# Patient Record
Sex: FEMALE | Race: WHITE
[De-identification: names, ages, dates, MRNs, and addresses within clinical notes are randomized per-mention and may not be internally consistent; named-entity substitution may affect disease eponyms.]

---

## 2019-03-31 ENCOUNTER — HOSPITAL ENCOUNTER (INPATIENT)
Dept: HOSPITAL 53 - M ED | Age: 34
LOS: 2 days | Discharge: HOME | DRG: 753 | End: 2019-04-02
Attending: PSYCHIATRY & NEUROLOGY | Admitting: PSYCHIATRY & NEUROLOGY
Payer: MEDICAID

## 2019-03-31 VITALS — WEIGHT: 158.73 LBS | HEIGHT: 63 IN | BODY MASS INDEX: 28.12 KG/M2

## 2019-03-31 DIAGNOSIS — F41.0: ICD-10-CM

## 2019-03-31 DIAGNOSIS — F17.210: ICD-10-CM

## 2019-03-31 DIAGNOSIS — F41.1: ICD-10-CM

## 2019-03-31 DIAGNOSIS — F31.89: Primary | ICD-10-CM

## 2019-03-31 DIAGNOSIS — F10.10: ICD-10-CM

## 2019-04-01 VITALS — SYSTOLIC BLOOD PRESSURE: 135 MMHG | DIASTOLIC BLOOD PRESSURE: 88 MMHG

## 2019-04-01 RX ADMIN — MULTIPLE VITAMINS W/ MINERALS TAB SCH TAB: TAB at 18:05

## 2019-04-01 RX ADMIN — Medication SCH MG: at 18:05

## 2019-04-01 RX ADMIN — FOLIC ACID SCH MG: 1 TABLET ORAL at 18:05

## 2019-04-01 RX ADMIN — NICOTINE SCH PATCH: 21 PATCH, EXTENDED RELEASE TRANSDERMAL at 18:05

## 2019-04-02 VITALS — SYSTOLIC BLOOD PRESSURE: 148 MMHG | DIASTOLIC BLOOD PRESSURE: 87 MMHG

## 2019-04-02 RX ADMIN — Medication SCH MG: at 09:06

## 2019-04-02 RX ADMIN — FOLIC ACID SCH MG: 1 TABLET ORAL at 09:06

## 2019-04-02 RX ADMIN — MULTIPLE VITAMINS W/ MINERALS TAB SCH TAB: TAB at 09:06

## 2019-04-02 RX ADMIN — NICOTINE SCH PATCH: 21 PATCH, EXTENDED RELEASE TRANSDERMAL at 09:06

## 2019-04-02 NOTE — MHHPEPDOC
General


Date Of Admission:  May 31, 2019


Legal Status:  9.39


Chief Complaint


"Pt was transferred from [facility] after being medically cleared.  

She took an OD of Motrin & sleeping pills while intoxicated".





History of Present Illness


HISTORY OF THE PRESENT ILLNESS: Patient is a 33 -year-old , female, who

as per ED report: "Pt states that she was drinking & wanted to take a couple Mo

bryanna so that she


would not wake up with a headache but she ended up taking 20 of them plus 4 OTC 

sleeping pills.  Pt denies SI & states the only reason she took the OD was 

because she was intoxicated & not thinking clearly.  Main triggers are that she 

recently quit her job & she was engaged to a boyfriend of 17 years that she has 

two children with but then she met someone else.  She is currently with the new 

boyfriend but feels that she has to "please everyone" & should go back to her 

fiance even though she is no longer in love with him.  Pt c/o anxiety & erratic 

sleep.  She denies all other mental health sx's, including depression.  Pt 

denies any prior suicide attempts.  She has no OP tx.  Her YASHIRA on admission to 

[facility] was 0.166 & her tox screen was negative for any drugs".





Psychiatric Review of Systems


Depression (2 or more weeks):  denies


Gala (4 or more days of):  denies


Psychosis:  denies


PTSD:  denies


Anxiety:  situational anxiety, panic attacks





Past Psychiatric History


Previous Psychiatric Diagnosis: Panic attacks, she says she had major ones x 3 

but she has had "minor ones" once or twice/year, so, she takes deep breaths or 

goes for a walk...


Previous Psychiatric Admissions: Denies


Suicide Attempts: Denies


Psychiatric Follow-up: Denies


Psychiatric medications: Xanax was prescribed in the past, a few years back but 

she only took one because she felt "like a zombie" and she didn't want to bcome 

addicted to it.





Past Medical History


Head Injury:  No


Seizures:  No


Hospitalizations:  Yes (She had a problem with her heart, but she doesn't 

remember what caused the problem, she says "it felt like an elephant sitting on 

her chest", she says she was told it was inflammation of her heart. She recived 

medications for it and it went away . 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 

delivery))


Surgeries:  No





Family Medical/Psychiatric HX


Medical Problems


Mother has high blood pressure, high cholesterol. Father has COPD, sister has 

lupus


Psychiatric Disorders:  No


Addiction:  No


Suicide Attemps/Completions:  No





Addiction History


nicotine (A pack/day for about 15 years), alcohol (occasionally (once /weekend),

10-12 beers and occasionally she adds 5 shots if she is at the bar)





Social History


Childhood: "Great". She has 3 sisters, got along with them. She was close to her

parents "I'm not bragging about being concepcion's little girl". She enjoyed going to

school.


Abuse/Trauma: Denies


Current Living Situation: She lives with her boyfriend, her children are at 

their grandparents (paternal)


Education: HS diploma, she wanted to go to College but couldn't because of the 

money factor


Employment: Unemployed


Social Support: Boyfriend, mother


Legal: Denies


Marital: , has 2 children (11 and 7)





Mental Status Examination


General Appearance:  well groomed, appears stated age, hospital scubs/clothing


Build:  average


Demeanor:  average


Eye Contact:  average


Activity:  average


Behavior:  cooperative


Speech:  spontaneous, reg/rate,rhythm,volume


Mood:  anxious


Affect:  full, appropriate, congruent, anxious


Thought Process:  logical/linear


Thought Content (Delusions):  none reported


Thought Content (Other):  none reported


Thought Content (Aggressive):  none reported


Perception (Hallucinations):  none reported


Perception (Other):  none reported


Cognition (Impairment of):  none reported


Cognition(Intelligence Est.):  average


Oriented:  Awake, Alert, Oriented times three


Insight:  fair


Judgment:  Fair


Psychosis:  Denies





Diagnoses


1. Other specified mood disorder


2. Generalized anxiety disorder


3. h/ Panic attacks





Assessment


Patient is pleasant and cooperative, she's not depressed, she is anxious and has

a nicotine use d/o plus alcohol use disorder but she is not insightful, she says

she can quit her alcohol use/abuse and nicotine use/abuse. Discussed this 

problem with her but she became a little bit





Problem List


Problems:  


(1) Alcohol abuse


Status:  Chronic





Initial Treatment Plan


1. Patient was admitted on a [9.39] status.


2. Complete history was obtained.


3. With patients permission, family will be contacted and database will be 

expanded. 


4. Patients medication regimen will be reviewed and changed accordingly. 


5. Patient will be provided with protected environment. 


6. Patient will be treated with individual, group, and milieu therapies. 


7. Patient will receive supportive psych-education.


8. Discharge planning will commence immediately.


9. Outpatient follow-up treatment will be strongly recommended.


10. The initial treatment plan will focus initially on:


* Depression.


* Risk for suicide.


* Substance abuse.





ESTIMATED LENGTH OF STAY: 5-7 DAYS.





TIME SPENT COUNSELING AND COORDINATING INITIAL CARE: 50 minutes.





Vital Signs





Vital Signs








  Date Time  Temp Pulse Resp B/P (MAP) Pulse Ox O2 Delivery O2 Flow Rate FiO2


 


4/2/19 07:33  75  148/87    


 


4/2/19 06:31 99.2  18     


 


4/1/19 20:08     100   


 


4/1/19 11:46      Room Air  











Medications


No Active Prescriptions or Reported Meds





Allergies


Coded Allergies:  


     No Known Allergies (Unverified , 3/31/19)











BOBBY CELAYA MD              Apr 2, 2019 11:05

## 2019-08-02 NOTE — MHDSPDOC
[facility] Discharge Summary


Discharge Summary


DATE OF ADMISSION: Apr 1, 2019 at 14:44 


DATE OF DISCHARGE: Apr 2, 2019 at 14:35





DISCHARGE DIAGNOSES:


1. Other specified mood disorder


2. Generalized anxiety disorder


3. h/ Panic attacks





REASON FOR ADMISSION: Chief Complaint


"Pt was transferred from [facility] after being medically cleared.  

She took an OD of Motrin & sleeping pills while intoxicated".





History of Present Illness


HISTORY OF THE PRESENT ILLNESS: Patient is a 33 -year-old , female, who

as per ED report: "Pt states that she was drinking & wanted to take a couple 

Motrin so that she


would not wake up with a headache but she ended up taking 20 of them plus 4 OTC 

sleeping pills.  Pt denies SI & states the only reason she took the OD was 

because she was intoxicated & not thinking clearly.  Main triggers are that she 

recently quit her job & she was engaged to a boyfriend of 17 years that she has 

two children with but then she met someone else.  She is currently with the new 

boyfriend but feels that she has to "please everyone" & should go back to her 

fiance even though she is no longer in love with him.  Pt c/o anxiety & erratic 

sleep.  She denies all other mental health sx's, including depression.  Pt 

denies any prior suicide attempts.  She has no OP tx.  Her YASHIRA on admission to 

[facility] was 0.166 & her tox screen was negative for any drugs".





CONSULTANTS INVOLVED: None





TREATMENT AND PROGRESS ON THE UNIT : The patient was pleasant and cooperative 

and she adamantly denied having suicidal ideation. she admits that it was a 

mistake to have used Motrin while she was under the influence of alcohol. The 

patient is going through a difficult time in her life and although she has been 

drinking alcohol she rationalizes and says she can control that, she says she 

can take care of it, [facility] the [facility]. This writer 

explained how alcohol can worsen depression and how it had affected hr 

judgement, so much, that she had taken several Motrin tablets and 4 OTC pills. 

She denies symptoms of depression and being suicidal. She didn't look depressed,

not anxious. She was goal orientated although she knew she had to find a 

solution to the problems in her life that had triggered her drinking on this 

occasion. While being at [facility] she took Seroquel and Zoloft, both of them 50 mgs 

and she denied medication side effects. she also took folic acid, 1 mg po daily,

Multivitamins, 1 tab po daily and Thiamine 100 mgs Po bid as par to of the 

alcohol withdrawal protocol. Included in this protocol i Ativan 2 mgs that is 

given by staff if patients are presenting with withdrawal symptoms but she 

didn't needed.





HOSPITAL COURSE: As above





DISCHARGE ASSESSMENT: The patient was not suicidal, not homicidal and not 

psychotic. She had been recnetly started on Zoloft and Seroquel and she denied 

medication side effects, in fact, she said she felt fine. She was goal 

orientated.





MENTAL STATUS EXAMINATION ON DISCHARGE: 


General Appearance:  well groomed, appears stated age, hospital scubs/clothing


Build:  average


Demeanor:  average


Eye Contact:  average


Activity:  average


Behavior:  cooperative


Speech:  spontaneous, reg/rate,rhythm,volume


Mood:  anxious


Affect:  full, appropriate, congruent, anxious


Thought Process:  logical/linear


Thought Content (Delusions):  none reported


Thought Content (Other):  none reported


Thought Content (Aggressive):  none reported


Perception (Hallucinations):  none reported


Perception (Other):  none reported


Cognition (Impairment of):  none reported


Cognition(Intelligence Est.):  average


Oriented:  Awake, Alert, Oriented times three


Insight:  fair


Judgment:  Fair


Psychosis:  Denies





MEDICATIONS ON DISCHARGE:


Scheduled


Multivitamins *[facility] STOCKED* (Thera M Plus *[facility] STOCKED*) 1 Tab Tab, 1 TAB PO 

DAILY for withrawals, #7


Nicotine (Nicotine Transdermal Syst) 21 Mg/24 Hr Dis, 1 PATCH TD DAILY for 

nicotine withdrawals, #7


Quetiapine Fumerate (Quetiapine Fumarate) 50 Mg Tab, 50 MG PO QHS for insomnia, 

#7


Sertraline Hcl (Sertraline HCl) 50 Mg Tab, 50 MG PO QHS for depression, #7


Thiamine Hcl (Thiamine Hcl) 100 Mg Tab, 100 MG PO BID for alcohol withdrawals, 

#14





PLAN/FOLLOWUP ARRANGEMENTS: Follow Up Care Education Label


* Medical


* Medical Follow Up 


   [facility]


* Established With This Provider 


   Yes


* Therapist 


   ALFIE ARRIETA


* Date 


   Apr 11, 2019


* Time 


   13:00


* Phone Number 


   [phone]        


Follow Up Care Education Label


* Mental Health Appt 1


* Mental Health 


   [facility]


* Established With This Provider 


   No


* Therapist 


   ROBI


* Date 


   Apr 5, 2019


* Time 


   09:00


* Phone Number 


   [phone]


* Additional information 


   Please arrive 15 minutes early to fill out new patient paperwork. Remember 


   to bring your insurance card and photo ID.








The amount of time spent in the coordination of care for this patient was 

approximately 30 minutes.





Vital Signs/I&Os





Vital Signs








  Date Time  Temp Pulse Resp B/P (MAP) Pulse Ox O2 Delivery O2 Flow Rate FiO2


 


4/2/19 07:33  75  148/87    


 


4/2/19 06:31 99.2  18     


 


4/1/19 20:08     100   


 


4/1/19 11:46      Room Air  











Medications


Scheduled


Multivitamins *[facility] STOCKED* (Thera M Plus *[facility] STOCKED*) 1 Tab Tab, 1 TAB PO 

DAILY for withrawals, #7


Nicotine (Nicotine Transdermal Syst) 21 Mg/24 Hr Dis, 1 PATCH TD DAILY for nicot

ine withdrawals, #7


Quetiapine Fumerate (Quetiapine Fumarate) 50 Mg Tab, 50 MG PO QHS for insomnia, 

#7


Sertraline Hcl (Sertraline HCl) 50 Mg Tab, 50 MG PO QHS for depression, #7


Thiamine Hcl (Thiamine Hcl) 100 Mg Tab, 100 MG PO BID for alcohol withdrawals, 

#14





Allergies


Coded Allergies:  


     No Known Allergies (Unverified , 3/31/19)











BOBBY CELAYA MD              Apr 6, 2019 14:52
Tabitha Clement from [facility] notified of crisis eval request. Pt remains asleep on cart, respirations even and unlabored. Skin Pink. Warm.  dry
no